# Patient Record
Sex: FEMALE | Race: WHITE | URBAN - NONMETROPOLITAN AREA
[De-identification: names, ages, dates, MRNs, and addresses within clinical notes are randomized per-mention and may not be internally consistent; named-entity substitution may affect disease eponyms.]

---

## 2022-11-16 ENCOUNTER — LAB OUTSIDE AN ENCOUNTER (OUTPATIENT)
Dept: URBAN - NONMETROPOLITAN AREA CLINIC 4 | Facility: CLINIC | Age: 52
End: 2022-11-16

## 2022-11-16 ENCOUNTER — OFFICE VISIT (OUTPATIENT)
Dept: URBAN - NONMETROPOLITAN AREA CLINIC 4 | Facility: CLINIC | Age: 52
End: 2022-11-16
Payer: COMMERCIAL

## 2022-11-16 ENCOUNTER — WEB ENCOUNTER (OUTPATIENT)
Dept: URBAN - NONMETROPOLITAN AREA CLINIC 4 | Facility: CLINIC | Age: 52
End: 2022-11-16

## 2022-11-16 VITALS
WEIGHT: 153.4 LBS | SYSTOLIC BLOOD PRESSURE: 129 MMHG | HEIGHT: 66 IN | BODY MASS INDEX: 24.65 KG/M2 | DIASTOLIC BLOOD PRESSURE: 80 MMHG | HEART RATE: 62 BPM | TEMPERATURE: 97.6 F

## 2022-11-16 DIAGNOSIS — N80.9 ENDOMETRIOSIS: ICD-10-CM

## 2022-11-16 DIAGNOSIS — Z12.11 SCREENING FOR COLON CANCER: ICD-10-CM

## 2022-11-16 DIAGNOSIS — R10.13 EPIGASTRIC PAIN: ICD-10-CM

## 2022-11-16 DIAGNOSIS — R79.89 ELEVATED D-DIMER: ICD-10-CM

## 2022-11-16 PROBLEM — 129103003: Status: ACTIVE | Noted: 2022-11-16

## 2022-11-16 PROCEDURE — 99204 OFFICE O/P NEW MOD 45 MIN: CPT | Performed by: REGISTERED NURSE

## 2022-11-16 RX ORDER — POLYETHYLENE GLYCOL-3350 AND ELECTROLYTES 236; 6.74; 5.86; 2.97; 22.74 G/274.31G; G/274.31G; G/274.31G; G/274.31G; G/274.31G
AS DIRECTED POWDER, FOR SOLUTION ORAL
Qty: 1 KIT | Refills: 0 | OUTPATIENT
Start: 2022-11-16 | End: 2022-11-17

## 2022-11-16 NOTE — HPI-TODAY'S VISIT:
11/16/22: Pt is a 51 yo female with PMH of hypothyrodism, endometriosis who was referred by GYN, Dr. Joanne Harrison, for evaluation of abdominal pain and colonoscopy.  A copy of this document will be sent to the referring physician.  Pt reports onset of epigastric pain after her surgery in July. Pain only occurs after she eats meat (pork or beef). Feels sluggish and gassy afterward. Had RUQ pain in September. Went to UC in [place]. Had US that was reportedly normal. Denies any N/V, heartburn, reflux, diarrhea, hematochezia, constipation unintentional weight loss. Pt has never had a screening colonoscopy. Denies FHx of CRC.   CA-125 was elevated at 49.2 in May. Thought to have uterine mass. Seen by GYN in June. Had partial hysterectomy, polypectomy in July 2022. Found with severe endometriosis. Most recent labs 10/22/22. CBC, CMP wnl. D-dimer elevated. Pt states she was bit by 2 lonestar tick twice last Summer. Has seen hematologist. Scheduled for CT A/P 12/9/22.

## 2022-11-16 NOTE — PHYSICAL EXAM MUSCULOSKELETAL:
normal gait and station , no tenderness or deformities present
Skin normal color for race, warm, dry and intact. No evidence of rash.

## 2022-11-17 ENCOUNTER — TELEPHONE ENCOUNTER (OUTPATIENT)
Dept: URBAN - NONMETROPOLITAN AREA CLINIC 4 | Facility: CLINIC | Age: 52
End: 2022-11-17

## 2022-12-09 ENCOUNTER — WEB ENCOUNTER (OUTPATIENT)
Dept: URBAN - METROPOLITAN AREA SURGERY CENTER 14 | Facility: SURGERY CENTER | Age: 52
End: 2022-12-09

## 2022-12-15 ENCOUNTER — OFFICE VISIT (OUTPATIENT)
Dept: URBAN - METROPOLITAN AREA SURGERY CENTER 14 | Facility: SURGERY CENTER | Age: 52
End: 2022-12-15
Payer: COMMERCIAL

## 2022-12-15 DIAGNOSIS — K29.60 ADENOPAPILLOMATOSIS GASTRICA: ICD-10-CM

## 2022-12-15 DIAGNOSIS — K31.89 ACQUIRED DEFORMITY OF DUODENUM: ICD-10-CM

## 2022-12-15 DIAGNOSIS — Z12.11 COLON CANCER SCREENING: ICD-10-CM

## 2022-12-15 DIAGNOSIS — R10.13 ABDOMINAL DISCOMFORT, EPIGASTRIC: ICD-10-CM

## 2022-12-15 PROCEDURE — G8907 PT DOC NO EVENTS ON DISCHARG: HCPCS | Performed by: INTERNAL MEDICINE

## 2022-12-15 PROCEDURE — 43239 EGD BIOPSY SINGLE/MULTIPLE: CPT | Performed by: INTERNAL MEDICINE

## 2022-12-15 PROCEDURE — G0121 COLON CA SCRN NOT HI RSK IND: HCPCS | Performed by: INTERNAL MEDICINE

## 2023-01-13 ENCOUNTER — WEB ENCOUNTER (OUTPATIENT)
Dept: URBAN - NONMETROPOLITAN AREA CLINIC 4 | Facility: CLINIC | Age: 53
End: 2023-01-13

## 2023-01-19 ENCOUNTER — DASHBOARD ENCOUNTERS (OUTPATIENT)
Age: 53
End: 2023-01-19

## 2023-01-19 ENCOUNTER — OFFICE VISIT (OUTPATIENT)
Dept: URBAN - NONMETROPOLITAN AREA CLINIC 4 | Facility: CLINIC | Age: 53
End: 2023-01-19
Payer: COMMERCIAL

## 2023-01-19 VITALS
TEMPERATURE: 97.9 F | BODY MASS INDEX: 25.23 KG/M2 | WEIGHT: 157 LBS | HEART RATE: 60 BPM | SYSTOLIC BLOOD PRESSURE: 121 MMHG | HEIGHT: 66 IN | DIASTOLIC BLOOD PRESSURE: 81 MMHG

## 2023-01-19 DIAGNOSIS — R10.13 EPIGASTRIC PAIN: ICD-10-CM

## 2023-01-19 DIAGNOSIS — Z12.11 SCREENING FOR COLON CANCER: ICD-10-CM

## 2023-01-19 PROCEDURE — 99213 OFFICE O/P EST LOW 20 MIN: CPT | Performed by: REGISTERED NURSE

## 2023-01-19 NOTE — HPI-TODAY'S VISIT:
11/16/22: Pt is a 51 yo female with PMH of hypothyrodism, endometriosis who was referred by GYN, Dr. Joanne Harrison, for evaluation of abdominal pain and colonoscopy.  A copy of this document will be sent to the referring physician.  Pt reports onset of epigastric pain after her surgery in July. Pain only occurs after she eats meat (pork or beef). Feels sluggish and gassy afterward. Had RUQ pain in September. Went to UC in [place]. Had US that was reportedly normal. Denies any N/V, heartburn, reflux, diarrhea, hematochezia, constipation unintentional weight loss. Pt has never had a screening colonoscopy. Denies FHx of CRC.   CA-125 was elevated at 49.2 in May. Thought to have uterine mass. Seen by GYN in June. Had partial hysterectomy, polypectomy in July 2022. Found with severe endometriosis. Most recent labs 10/22/22. CBC, CMP wnl. D-dimer elevated. Pt states she was bit by 2 lonestar tick twice last Summer. Has seen hematologist. Scheduled for CT A/P 12/9/22.  1/19/23: Pt RTC for f/u. Had EGD/colon 12/15/22. EGD findings: - Normal esophagus. - Z-line regular, 40 cm from the incisors. - Erythematous mucosa in the stomach. Biopsied. - Normal examined duodenum. Biopsied. Bx c/w benign inflammation Cscope findings: - The entire examined colon is normal. - Internal hemorrhoids. - No specimens collected.  Denies any further abdominal pain. Has eliminated pork and beef from her diet completely. She did food allergy testing with [facility].